# Patient Record
Sex: FEMALE | Race: WHITE
[De-identification: names, ages, dates, MRNs, and addresses within clinical notes are randomized per-mention and may not be internally consistent; named-entity substitution may affect disease eponyms.]

---

## 2022-12-08 ENCOUNTER — APPOINTMENT (OUTPATIENT)
Dept: OTOLARYNGOLOGY | Facility: CLINIC | Age: 62
End: 2022-12-08

## 2022-12-08 VITALS — HEIGHT: 62 IN | TEMPERATURE: 97.2 F | WEIGHT: 105 LBS | BODY MASS INDEX: 19.32 KG/M2

## 2022-12-08 DIAGNOSIS — R49.0 DYSPHONIA: ICD-10-CM

## 2022-12-08 DIAGNOSIS — R05.8 OTHER SPECIFIED COUGH: ICD-10-CM

## 2022-12-08 DIAGNOSIS — J02.9 ACUTE PHARYNGITIS, UNSPECIFIED: ICD-10-CM

## 2022-12-08 PROBLEM — Z00.00 ENCOUNTER FOR PREVENTIVE HEALTH EXAMINATION: Status: ACTIVE | Noted: 2022-12-08

## 2022-12-08 PROCEDURE — 31575 DIAGNOSTIC LARYNGOSCOPY: CPT

## 2022-12-08 PROCEDURE — 99204 OFFICE O/P NEW MOD 45 MIN: CPT | Mod: 25

## 2022-12-09 PROBLEM — J02.9 SORE THROAT: Status: RESOLVED | Noted: 2022-12-09 | Resolved: 2023-01-08

## 2022-12-09 PROBLEM — R49.0 HOARSENESS: Status: ACTIVE | Noted: 2022-12-09

## 2022-12-09 NOTE — ASSESSMENT
[FreeTextEntry1] : My differential diagnosis includes but is not limited to a primary pulmonary pathology, LPR, neurogenic cough.\par I further explained to her that it is likely that there are several contributing factors.\par She will continue diet and lifestyle modifications for LPR.\par Patient education material for LPR was provided.\par I will recommend she discontinue PPI treatment and take famotidine 40 mg twice daily.\par I will also recommend she drink alkaline water.\par \par Further I am going to give her a trial of Elavil 25 mg for treatment for neurogenic cough.  I explained to her that this is a neuro anesthetic.\par In these cases I recommend patients take 25 mg in the evening to make sure there is not any sedation.\par She knows to touch base with me next week if she is able to tolerate the medication without sedation I will increase her dose.\par \par I did also emphasize the importance of following up with the sarcoid doctor.

## 2022-12-09 NOTE — HISTORY OF PRESENT ILLNESS
[de-identified] : Initial visit.\par Her chief complaint is "3-1/2 months of coughing".\par She also reports a low-grade sore throat.\par \par She is a very reliable historian.  She developed a cough 3 and half months ago.  It is essentially stayed with her since that time.  She does report that it comes and goes.\par She has had extensive treatment and evaluations.  \par She reports that she has an incidental diagnosis of pulmonary sarcoidosis.  She was having a work-up for a bony growth on her right clavicle.  CT scan suggested sarcoidosis.  At the time she had a work-up and it was decided that it was incidental and no further work-up or treatment required.  This was in the past.\par She has been treated for reflux with various PPI treatment.  She says some of them have helped her.  She has modified her diet.  She went to a gastroenterologist who recommended she see a pulmonologist.  The pulmonologist does not feel that the is being caused by sarcoidosis.  She is scheduled to see a sarcoidosis expert according to her in the future.  The GI doctor sent for chest x-ray which apparently did not show any evidence of infection or malignancy.\par Inhaler seem to help her some.\par She is used topical nasal sprays without benefit.\par She is use guaifenesin as well.\par In late October she took 5 days of oral prednisone 50 mg and that seemed to help her some.\par She is otherwise healthy.\par She is not having any hemoptysis or hoarseness.\par She does have a sore throat, intermittently.

## 2022-12-09 NOTE — PROCEDURE
[de-identified] : PROCEDURE: Flexible laryngoscopy\par SURGEON: Dr. Bailey\par INDICATIONS: He was unable to tolerate a mirror exam. Assess for laryngopharynx pharyngeal reflux. cough. head and neck mass. \par ANESTHESIA: The patient was placed in a sitting position.  Following application of the topical anesthetic and decongestant, exam was performed with a flexible scope.  The scope was passed along the right nasal floor to the nasopharynx.  It was then passed into the region of the middle meatus, middle turbinate, and sphenoethmoid region.  An identical procedure was performed on the left side.  The following findings were noted:\par \par The nasal musoca was healthy appearing and the septum was roughly midline. The middle meatus and sphenoethmoid recesses were clear bilaterally. The nasopharynx \par \par Nasopharynx: no masses, choanae patent, no adenoid tissue\par \par Base of tongue/vallecula: no masses or asymmetry\par Pharyngeal walls: symmetrical. No masses.\par Pyriform sinuses: no lesions or pooling of secretions.\par Epiglottis: normal. No edema or lesions.\par Aryepiglottic folds: normal. No lesions. \par Vocal cords: clear and mobile. No lesions. Airway patent.\par Arytenoids: no edema or erythema. \par Interarytenoid area: no edema, erythema or lesion.\par

## 2023-04-04 ENCOUNTER — APPOINTMENT (OUTPATIENT)
Dept: OTOLARYNGOLOGY | Facility: CLINIC | Age: 63
End: 2023-04-04
Payer: MEDICAID

## 2023-04-04 VITALS — BODY MASS INDEX: 19.32 KG/M2 | HEIGHT: 62 IN | WEIGHT: 105 LBS

## 2023-04-04 DIAGNOSIS — J34.2 DEVIATED NASAL SEPTUM: ICD-10-CM

## 2023-04-04 DIAGNOSIS — J34.89 OTHER SPECIFIED DISORDERS OF NOSE AND NASAL SINUSES: ICD-10-CM

## 2023-04-04 DIAGNOSIS — J34.3 HYPERTROPHY OF NASAL TURBINATES: ICD-10-CM

## 2023-04-04 PROCEDURE — 99214 OFFICE O/P EST MOD 30 MIN: CPT | Mod: 25

## 2023-04-04 PROCEDURE — 31231 NASAL ENDOSCOPY DX: CPT

## 2023-04-04 RX ORDER — PREDNISONE 50 MG/1
50 TABLET ORAL
Qty: 5 | Refills: 0 | Status: COMPLETED | COMMUNITY
Start: 2022-10-29 | End: 2023-04-04

## 2023-04-04 RX ORDER — OMEPRAZOLE 20 MG/1
20 CAPSULE, DELAYED RELEASE ORAL
Qty: 30 | Refills: 0 | Status: COMPLETED | COMMUNITY
Start: 2022-11-21 | End: 2023-04-04

## 2023-04-04 RX ORDER — MULTIVITAMIN
TABLET ORAL
Qty: 90 | Refills: 0 | Status: COMPLETED | COMMUNITY
Start: 2022-08-26 | End: 2023-04-04

## 2023-04-04 RX ORDER — GUAIFENESIN AND CODEINE PHOSPHATE 10; 100 MG/5ML; MG/5ML
100-10 SOLUTION ORAL
Qty: 120 | Refills: 0 | Status: COMPLETED | COMMUNITY
Start: 2022-10-31 | End: 2023-04-04

## 2023-04-04 RX ORDER — TRETINOIN 0.25 MG/G
0.03 CREAM TOPICAL
Qty: 45 | Refills: 0 | Status: COMPLETED | COMMUNITY
Start: 2022-09-13 | End: 2023-04-04

## 2023-04-04 RX ORDER — ALBUTEROL SULFATE 90 UG/1
108 (90 BASE) INHALANT RESPIRATORY (INHALATION)
Qty: 6 | Refills: 0 | Status: COMPLETED | COMMUNITY
Start: 2022-11-21 | End: 2023-04-04

## 2023-04-04 RX ORDER — LANSOPRAZOLE 30 MG/1
30 CAPSULE, DELAYED RELEASE ORAL
Qty: 30 | Refills: 0 | Status: COMPLETED | COMMUNITY
Start: 2022-12-01 | End: 2023-04-04

## 2023-04-04 RX ORDER — AZITHROMYCIN 250 MG/1
250 TABLET, FILM COATED ORAL
Qty: 6 | Refills: 0 | Status: COMPLETED | COMMUNITY
Start: 2022-10-31 | End: 2023-04-04

## 2023-04-04 RX ORDER — AMITRIPTYLINE HYDROCHLORIDE 25 MG/1
25 TABLET, FILM COATED ORAL
Qty: 5 | Refills: 0 | Status: COMPLETED | COMMUNITY
Start: 2022-12-08 | End: 2023-04-04

## 2023-04-04 RX ORDER — FLUTICASONE PROPIONATE 50 UG/1
50 SPRAY, METERED NASAL
Qty: 16 | Refills: 0 | Status: COMPLETED | COMMUNITY
Start: 2022-10-27 | End: 2023-04-04

## 2023-04-05 PROBLEM — J34.2 DNS (DEVIATED NASAL SEPTUM): Status: ACTIVE | Noted: 2023-04-05

## 2023-04-05 PROBLEM — J34.3 NASAL TURBINATE HYPERTROPHY: Status: ACTIVE | Noted: 2023-04-05

## 2023-04-05 PROBLEM — J34.89 NASAL VALVE BLOCKAGE: Status: ACTIVE | Noted: 2023-04-05

## 2023-04-05 NOTE — HISTORY OF PRESENT ILLNESS
[de-identified] : Comes in today complaining of perennial poor nasal airway.\par She reports that this makes it difficult for her to breath at times.  This is especially noticed during exercise.\par She has used topical tx for short periods of time in the past.

## 2023-04-05 NOTE — ASSESSMENT
[FreeTextEntry1] : Options discussed were observation, long trial of topical therapy (prove and possible benefit if mucosal), in office radioablation of bilateral inferior turbinates, septal swell body, and upper lateral cartilage, vs. septoplasty and turbinate reduction.\par Discussion on variable outcomes with all approaches.\par \par She will consider her options.

## 2023-04-05 NOTE — PROCEDURE
[FreeTextEntry6] : PROCEDURE NOTES\par \par PROCEDURE: Nasal endoscopy \par SURGEON: Dr. Bailey\par INDICATIONS: Assess for chronic sinusitis. \par ANESTHESIA: The patient was placed in a sitting position.  Following application of the topical anesthetic and decongestant, exam was performed with a zero degree endoscope.  The scope was passed along the right nasal floor to the nasopharynx.  It was then passed into the region of the middle meatus, middle turbinate, and sphenoethmoid region.  An identical procedure was performed on the left side.  The following findings were noted:\par \par The nasal mucosa was healthy appearing and the septum has a caudal deflection on the right and maxillary crest on left.  bilateral turbinate hypertrophy. The middle meatus and sphenoethmoid recesses were clear bilaterally. The Superior meatus was without lesion or infection.  The Inferior, Middle and Superior Turbinates were not enlarged and healthy in appearance.  There was not pus, polyps or mass.  The nasopharynx was normal. \par Little improvement after aggressive topicalization.\par \par \par

## 2025-05-19 ENCOUNTER — OFFICE (OUTPATIENT)
Dept: URBAN - METROPOLITAN AREA CLINIC 92 | Facility: CLINIC | Age: 65
Setting detail: OPHTHALMOLOGY
End: 2025-05-19
Payer: COMMERCIAL

## 2025-05-19 DIAGNOSIS — H01.004: ICD-10-CM

## 2025-05-19 DIAGNOSIS — H35.362: ICD-10-CM

## 2025-05-19 DIAGNOSIS — H01.001: ICD-10-CM

## 2025-05-19 DIAGNOSIS — H25.13: ICD-10-CM

## 2025-05-19 PROCEDURE — 92004 COMPRE OPH EXAM NEW PT 1/>: CPT | Performed by: SPECIALIST

## 2025-05-19 PROCEDURE — 92250 FUNDUS PHOTOGRAPHY W/I&R: CPT | Performed by: SPECIALIST

## 2025-05-19 ASSESSMENT — TONOMETRY
OD_IOP_MMHG: 12
OS_IOP_MMHG: 12

## 2025-05-19 ASSESSMENT — CONFRONTATIONAL VISUAL FIELD TEST (CVF)
OS_FINDINGS: FULL
OD_FINDINGS: FULL

## 2025-05-19 ASSESSMENT — LID EXAM ASSESSMENTS
OD_BLEPHARITIS: RUL 2+ 3+
OS_BLEPHARITIS: LUL 2+ 3+
OD_COMMENTS: COLLARETTES
OS_COMMENTS: COLLARETTES

## 2025-05-19 ASSESSMENT — VISUAL ACUITY
OD_BCVA: 20/30-2
OS_BCVA: 20/25+

## 2025-07-21 ENCOUNTER — OFFICE (OUTPATIENT)
Dept: URBAN - METROPOLITAN AREA CLINIC 92 | Facility: CLINIC | Age: 65
Setting detail: OPHTHALMOLOGY
End: 2025-07-21
Payer: COMMERCIAL

## 2025-07-21 DIAGNOSIS — Z51.89 ENCOUNTER FOR OTHER SPECIFIED AFTERCARE: ICD-10-CM

## 2025-07-21 DIAGNOSIS — H01.004: ICD-10-CM

## 2025-07-21 DIAGNOSIS — H01.001: ICD-10-CM

## 2025-07-21 PROCEDURE — 99213 OFFICE O/P EST LOW 20 MIN: CPT | Performed by: SPECIALIST

## 2025-07-21 ASSESSMENT — VISUAL ACUITY
OD_BCVA: 20/30-2
OS_BCVA: 20/25+

## 2025-07-21 ASSESSMENT — CONFRONTATIONAL VISUAL FIELD TEST (CVF)
OD_FINDINGS: FULL
OS_FINDINGS: FULL

## 2025-07-21 ASSESSMENT — LID EXAM ASSESSMENTS
OD_BLEPHARITIS: RUL T
OS_BLEPHARITIS: LUL T
OD_COMMENTS: COLLARETTES
OS_COMMENTS: COLLARETTES